# Patient Record
Sex: MALE | Race: OTHER
[De-identification: names, ages, dates, MRNs, and addresses within clinical notes are randomized per-mention and may not be internally consistent; named-entity substitution may affect disease eponyms.]

---

## 2018-03-17 ENCOUNTER — HOSPITAL ENCOUNTER (EMERGENCY)
Dept: HOSPITAL 24 - ER | Age: 53
Discharge: HOME | End: 2018-03-17
Payer: COMMERCIAL

## 2018-03-17 VITALS — DIASTOLIC BLOOD PRESSURE: 74 MMHG | SYSTOLIC BLOOD PRESSURE: 112 MMHG

## 2018-03-17 VITALS — BODY MASS INDEX: 27.3 KG/M2

## 2018-03-17 DIAGNOSIS — R07.81: Primary | ICD-10-CM

## 2018-03-17 PROCEDURE — 71111 X-RAY EXAM RIBS/CHEST4/> VWS: CPT

## 2018-03-17 PROCEDURE — 99282 EMERGENCY DEPT VISIT SF MDM: CPT
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
## 2018-03-17 NOTE — RAD
Examination: Left ribs



History: Pain



Findings: PA chest demonstrates normal heart size with clear lungs and pleural spaces. Additional vie
ws of the left ribs indicate no evidence for fracture, contour deformity or osteolytic process. There
 is no evidence for pneumothorax or pleural fluid. The lower ribs below the diaphragm are significant
ly obscured.



Impression: No acute chest or left rib abnormality demonstrated. See above.



Reported By:Electronically Signed by YESENIA GOMEZ MD at 3/17/2018 5:44:38 PM